# Patient Record
Sex: MALE | Race: WHITE | NOT HISPANIC OR LATINO | Employment: OTHER | URBAN - NONMETROPOLITAN AREA
[De-identification: names, ages, dates, MRNs, and addresses within clinical notes are randomized per-mention and may not be internally consistent; named-entity substitution may affect disease eponyms.]

---

## 2023-11-20 ENCOUNTER — TELEPHONE (OUTPATIENT)
Dept: UROLOGY | Facility: CLINIC | Age: 68
End: 2023-11-20

## 2023-11-20 NOTE — TELEPHONE ENCOUNTER
Caller: KAROLINA    Relationship to patient: SELF    Best call back number: [phone]    Patient is needing: PT IS SCHEDULED FOR 2/5/24 BUT IS IN PAIN DAILY ESPECIALLY WHEN WAKING UP.  IS THERE ANY WAY TO GET SOONER APPT FOR THIS PT?

## 2023-12-07 ENCOUNTER — OFFICE VISIT (OUTPATIENT)
Dept: UROLOGY | Facility: CLINIC | Age: 68
End: 2023-12-07
Payer: MEDICARE

## 2023-12-07 VITALS
WEIGHT: 179.4 LBS | HEIGHT: 69 IN | HEART RATE: 84 BPM | SYSTOLIC BLOOD PRESSURE: 164 MMHG | BODY MASS INDEX: 26.57 KG/M2 | DIASTOLIC BLOOD PRESSURE: 94 MMHG

## 2023-12-07 DIAGNOSIS — N40.1 BENIGN PROSTATIC HYPERPLASIA WITH WEAK URINARY STREAM: Primary | ICD-10-CM

## 2023-12-07 DIAGNOSIS — R10.9 BILATERAL FLANK PAIN: ICD-10-CM

## 2023-12-07 DIAGNOSIS — R39.12 BENIGN PROSTATIC HYPERPLASIA WITH WEAK URINARY STREAM: Primary | ICD-10-CM

## 2023-12-07 DIAGNOSIS — N28.1 BILATERAL RENAL CYSTS: ICD-10-CM

## 2023-12-07 DIAGNOSIS — N52.01 ERECTILE DYSFUNCTION DUE TO ARTERIAL INSUFFICIENCY: ICD-10-CM

## 2023-12-07 PROCEDURE — 84153 ASSAY OF PSA TOTAL: CPT

## 2023-12-07 RX ORDER — PRAVASTATIN SODIUM 40 MG
40 TABLET ORAL NIGHTLY
COMMUNITY
Start: 2023-09-06

## 2023-12-07 RX ORDER — GLIPIZIDE 10 MG/1
10 TABLET ORAL 2 TIMES DAILY
COMMUNITY
Start: 2023-09-15

## 2023-12-07 RX ORDER — OMEPRAZOLE 40 MG/1
40 CAPSULE, DELAYED RELEASE ORAL DAILY
COMMUNITY
Start: 2023-09-27

## 2023-12-07 RX ORDER — EMPAGLIFLOZIN 25 MG/1
25 TABLET, FILM COATED ORAL DAILY
COMMUNITY
Start: 2023-09-15

## 2023-12-07 RX ORDER — MELOXICAM 15 MG/1
15 TABLET ORAL DAILY
COMMUNITY
Start: 2023-09-27

## 2023-12-07 RX ORDER — ZOLPIDEM TARTRATE 10 MG/1
10 TABLET ORAL NIGHTLY PRN
COMMUNITY
Start: 2023-09-27

## 2023-12-07 RX ORDER — TADALAFIL 20 MG/1
20 TABLET ORAL AS NEEDED
Qty: 20 TABLET | Refills: 1 | Status: SHIPPED | OUTPATIENT
Start: 2023-12-07

## 2023-12-07 RX ORDER — DULAGLUTIDE 4.5 MG/.5ML
4.5 INJECTION, SOLUTION SUBCUTANEOUS WEEKLY
COMMUNITY
Start: 2023-08-21

## 2023-12-07 RX ORDER — GABAPENTIN 400 MG/1
2 CAPSULE ORAL 3 TIMES DAILY
COMMUNITY
Start: 2023-09-15

## 2023-12-07 RX ORDER — TADALAFIL 5 MG/1
5 TABLET ORAL DAILY
Qty: 30 TABLET | Refills: 1 | Status: SHIPPED | OUTPATIENT
Start: 2023-12-07

## 2023-12-07 NOTE — PROGRESS NOTES
"Chief Complaint:    Chief Complaint   Patient presents with    Kidney Cyst     New patient       Vital Signs:   /94   Pulse 84   Ht 175.3 cm (69\")   Wt 81.4 kg (179 lb 6.4 oz)   BMI 26.49 kg/m²   Body mass index is 26.49 kg/m².      HPI:  Shane Mc is a 68 y.o. male who presents today for initial evaluation     History of Present Illness  Mr. Mc presents to the clinic for evaluation of renal cysts, bilateral flank pain, prostatic enlargement, and erectile dysfunction.  Patient reports that he began to have right-sided lower quadrant and left-sided flank/quadrant pain in October and went to the ER for evaluation.  He had a CT scan of the abdomen pelvis completed at that time that showed hepatic and bilateral renal simple cysts. There was a 2 cm exophytic lesion arising from the upper lateral right kidney consistent with a cyst. A smaller, indeterminate, hypodense lesion was seen arising from the medial pole of the left kidney and also likely represented a cyst. There was also a 1.6 cm hypodensity in the posterior left kidney consistent with a cyst.  His hepatic cyst measured roughly 1.7 cm in size.  He also had notable prostatic enlargement at 6 cm with some distention of the urinary bladder.  No significant hydronephrosis or stones were identified.  He had moderate constipation as well.  Patient states he has a bowel movement roughly every 1 to 2 days.  States that his flank pain is worse when sitting for long periods of time.  He did have known arthritic changes of the hips bilaterally as well as the lumbar facets.  He does complain of some difficulty urinating, urgency, weak stream, hesitancy, and frequency.  He does not get up at all throughout the night.  Denies any family history of prostate cancer.  He is unsure if he has had a PSA in the past.  Digital rectal exam today the prostate is roughly 40 g on palpation but is smooth and firm to palpation with no signs of any nodules.  He does " "endorse a history of erectile dysfunction which she has tried sildenafil and tadalafil in the past with no improvement.  Patient reports that he can take \" 200 mg of Viagra and go to sleep.\"  He is a diabetic and reports his last A1c was roughly 7.        Past Medical History:  No past medical history on file.    Current Meds:  Current Outpatient Medications   Medication Sig Dispense Refill    gabapentin (NEURONTIN) 400 MG capsule Take 2 capsules by mouth 3 (Three) Times a Day.      glipizide (GLUCOTROL) 10 MG tablet Take 1 tablet by mouth 2 (Two) Times a Day.      Jardiance 25 MG tablet tablet Take 1 tablet by mouth Daily.      meloxicam (MOBIC) 15 MG tablet Take 1 tablet by mouth Daily.      omeprazole (priLOSEC) 40 MG capsule Take 1 capsule by mouth Daily.      pravastatin (PRAVACHOL) 40 MG tablet Take 1 tablet by mouth Every Night.      sertraline (ZOLOFT) 50 MG tablet Take 1 tablet by mouth Daily.      Trulicity 4.5 MG/0.5ML solution pen-injector Inject 0.5 mL as directed 1 (One) Time Per Week.      zolpidem (AMBIEN) 10 MG tablet Take 1 tablet by mouth At Night As Needed.      tadalafil (Cialis) 20 MG tablet Take 1 tablet by mouth As Needed for Erectile Dysfunction. 20 tablet 1    tadalafil (CIALIS) 5 MG tablet Take 1 tablet by mouth Daily. 30 tablet 1     No current facility-administered medications for this visit.        Allergies:   No Known Allergies     Past Surgical History:  No past surgical history on file.    Social History:  Social History     Socioeconomic History    Marital status:    Tobacco Use    Smoking status: Never   Vaping Use    Vaping Use: Never used       Family History:  No family history on file.    Review of Systems:  Review of Systems   Constitutional:  Negative for fatigue, fever and unexpected weight change.   Respiratory:  Negative for chest tightness and shortness of breath.    Cardiovascular:  Negative for chest pain.   Gastrointestinal:  Positive for abdominal pain and " diarrhea. Negative for constipation, nausea and vomiting.   Genitourinary:  Positive for difficulty urinating, flank pain and urgency. Negative for dysuria and frequency.   Skin:  Negative for rash.   Psychiatric/Behavioral:  Negative for confusion and suicidal ideas.        Physical Exam:  Physical Exam  Constitutional:       General: He is not in acute distress.     Appearance: Normal appearance.   HENT:      Head: Normocephalic and atraumatic.      Nose: Nose normal.      Mouth/Throat:      Mouth: Mucous membranes are moist.   Eyes:      Conjunctiva/sclera: Conjunctivae normal.   Cardiovascular:      Rate and Rhythm: Normal rate and regular rhythm.      Pulses: Normal pulses.      Heart sounds: Normal heart sounds.   Pulmonary:      Effort: Pulmonary effort is normal.      Breath sounds: Normal breath sounds.   Abdominal:      General: Bowel sounds are normal.      Palpations: Abdomen is soft.   Genitourinary:     Comments: Enlarged smooth firm prostate with no nodules noted.  Musculoskeletal:         General: Normal range of motion.      Cervical back: Normal range of motion.   Skin:     General: Skin is warm.   Neurological:      General: No focal deficit present.      Mental Status: He is alert and oriented to person, place, and time.   Psychiatric:         Mood and Affect: Mood normal.         Behavior: Behavior normal.         Thought Content: Thought content normal.         Judgment: Judgment normal.           Recent Image (CT and/or KUB):   CT Abdomen and Pelvis: No results found for this or any previous visit.     CT Stone Protocol: No results found for this or any previous visit.     KUB: No results found for this or any previous visit.       Labs:  Brief Urine Lab Results       None          No visits with results within 3 Month(s) from this visit.   Latest known visit with results is:   No results found for any previous visit.        Procedure: None  Procedures     I have reviewed and agree with the  above PMH, PSH, FMH, social history, medications, allergies, and labs.     Assessment/Plan:   Problem List Items Addressed This Visit          Gastrointestinal Abdominal     Bilateral flank pain       Genitourinary and Reproductive     Benign prostatic hyperplasia with weak urinary stream - Primary    Relevant Medications    tadalafil (CIALIS) 5 MG tablet    tadalafil (Cialis) 20 MG tablet    Other Relevant Orders    PSA Diagnostic    Erectile dysfunction due to arterial insufficiency    Relevant Medications    tadalafil (Cialis) 20 MG tablet    Bilateral renal cysts       Health Maintenance:   Health Maintenance Due   Topic Date Due    URINE MICROALBUMIN  Never done    BMI FOLLOWUP  Never done    Pneumococcal Vaccine 65+ (1 - PCV) Never done    HEPATITIS C SCREENING  Never done    ANNUAL WELLNESS VISIT  Never done    DIABETIC FOOT EXAM  Never done    HEMOGLOBIN A1C  Never done    DIABETIC EYE EXAM  Never done        Smoking Counseling: Never smoked or used smokeless tobacco.  Counseling given.    Urine Incontinence: Patient reports that he is not currently experiencing any symptoms of urinary incontinence.    Patient was given instructions and counseling regarding his condition or for health maintenance advice. Please see specific information pulled into the AVS if appropriate.    Patient Education:   Benign Prostate Hypertrophy (BPH): Discussed the pathophysiology of BPH and obstruction.  We discussed the static and dynamic effects of BPH as well as using 5 alpha reductase inhibitors versus alpha blockade.  We discussed the indications for transurethral surgery as well and/ or other therapeutic options available including all of the newer techniques.  Given patient's lower urinary tract symptoms and urinary distention on CT I am recommending to start tadalafil 5 mg once nightly.  Discussed the risk and benefits of this medication with the patient.  I did discuss dual therapy with finasteride and other 5 alpha  reductase inhibitors hide I am recommending to hold off until PSA results are obtained.  Patient verbalized understanding.  PSA testing - I am recommending a prostate specific antigen blood test or PSA.  I discussed the pathophysiology of PSA testing indicating its use in the diagnosis and management of prostate cancer.  I discussed the normal range being 0 to 4, but more appropriately being much closer to 0 to 2 in a normal male.  I discussed the fact that after a certain age it is against recommendation to use PSA testing especially in view of numerous comorbidities.  I discussed many of the things that can artificially raise PSA including put not limited to a recent infection, urinary tract infection, recent sexual intercourse, or even the type of movement such as manipulation of the prostate from riding a bicycle.  It was discussed that the most important use of PSA is the velocity measurement.  This refers to the change of PSA with time. I discussed that we look for greater than 20% rise over a year to help us make the prediction of prostate cancer.  I also discussed that in the case of prostate cancer indicating a radical prostatectomy, the PSA should be 0 and any rise indicates an early biochemical recurrence.  I will call patient with PSA results once obtained.  Bilateral renal cysts -discussed with the patient the pathophysiology of renal cyst.  Discussed with patient classifications of renal cyst including a Bosniak type I-IV.  Advising type I and II was low risk for renal cell carcinoma at less than 10 to 15% respectively.  Advised patient that a type III and IV are high risk for renal cell carcinoma at roughly 50 to 100%.  Did discuss further work-up with a CT scan with and without contrast versus biopsy when indicated.  Patient had 2-3 simple appearing renal cysts on CT scan roughly 2 months ago.  At this time recommending observation.  We can repeat a ultrasound in roughly 6 months for reevaluation.   Patient verbalized understanding.  Flank pain -advised patient that his flank pain may be secondary to lumbar facet and bilateral hip arthritis.  Also advised him flank pain could be secondary to constipation and urinary distention from enlarged prostate.  Recommend to take a daily stool softener twice daily.  Vies patient to increase water intake to 2 to 3 L/day.  Advised patient to use ice in areas where pain occurs.  Erectile dysfunction -discussed with the patient the pathophysiology of erectile dysfunction.  I explained to the patient that erectile dysfunction is a symptom and no disorder.  I educated the patient that erectile dysfunction is often secondary to significant arterial insufficiency, diabetes mellitus, medications, trauma, low testosterone, or psychological factors.  I discussed with the patient ways to help treat erectile dysfunction which include but are not limited to medications, mechanical devices, penile injections, and penile implants.  Patient has tried oral medications in the past with minimal improvement.  He would like to retry tadalafil 20 mg on demand as needed.  I will give the patient a refill.  Advised the risk and benefits of this medication.  Advised patient to discontinue medication if he begins to experience chest pain, shortness of breath, prolonged erection, blurry vision, lightheadedness, or dizziness.  Vies him to go to the nearest ER if he has erection lasting longer than 2 hours on becomes painful.  Otherwise I placed the patient back in 2 months for reevaluation.      Visit Diagnoses:    ICD-10-CM ICD-9-CM   1. Benign prostatic hyperplasia with weak urinary stream  N40.1 600.01    R39.12 788.62   2. Erectile dysfunction due to arterial insufficiency  N52.01 607.84   3. Bilateral flank pain  R10.9 789.09   4. Bilateral renal cysts  N28.1 753.10       Meds Ordered During Visit:  New Medications Ordered This Visit   Medications    tadalafil (CIALIS) 5 MG tablet     Sig: Take  1 tablet by mouth Daily.     Dispense:  30 tablet     Refill:  1    tadalafil (Cialis) 20 MG tablet     Sig: Take 1 tablet by mouth As Needed for Erectile Dysfunction.     Dispense:  20 tablet     Refill:  1       Follow Up Appointment: 2 months or sooner if needed  No follow-ups on file.      This document has been electronically signed by Perry Patel PA-C   December 7, 2023 11:57 EST    Part of this note may be an electronic transcription/translation of spoken language to printed text using the Dragon Dictation System.

## 2023-12-08 ENCOUNTER — TELEPHONE (OUTPATIENT)
Dept: UROLOGY | Facility: CLINIC | Age: 68
End: 2023-12-08
Payer: MEDICARE

## 2023-12-08 DIAGNOSIS — N40.1 BENIGN PROSTATIC HYPERPLASIA WITH WEAK URINARY STREAM: Primary | ICD-10-CM

## 2023-12-08 DIAGNOSIS — R39.12 BENIGN PROSTATIC HYPERPLASIA WITH WEAK URINARY STREAM: Primary | ICD-10-CM

## 2023-12-08 LAB — PSA SERPL-MCNC: 2.12 NG/ML (ref 0–4)

## 2023-12-08 RX ORDER — FINASTERIDE 5 MG/1
5 TABLET, FILM COATED ORAL DAILY
Qty: 30 TABLET | Refills: 5 | Status: SHIPPED | OUTPATIENT
Start: 2023-12-08

## 2023-12-08 NOTE — TELEPHONE ENCOUNTER
Patients PSA was normal at 2.12. We will send in finasteride to start once daily. Patient verbalized understanding.

## 2024-05-28 DIAGNOSIS — R39.12 BENIGN PROSTATIC HYPERPLASIA WITH WEAK URINARY STREAM: ICD-10-CM

## 2024-05-28 DIAGNOSIS — N40.1 BENIGN PROSTATIC HYPERPLASIA WITH WEAK URINARY STREAM: ICD-10-CM

## 2024-05-28 RX ORDER — FINASTERIDE 5 MG/1
5 TABLET, FILM COATED ORAL DAILY
Qty: 90 TABLET | Refills: 0 | Status: SHIPPED | OUTPATIENT
Start: 2024-05-28

## 2024-08-22 DIAGNOSIS — N40.1 BENIGN PROSTATIC HYPERPLASIA WITH WEAK URINARY STREAM: ICD-10-CM

## 2024-08-22 DIAGNOSIS — R39.12 BENIGN PROSTATIC HYPERPLASIA WITH WEAK URINARY STREAM: ICD-10-CM

## 2024-08-22 RX ORDER — FINASTERIDE 5 MG/1
5 TABLET, FILM COATED ORAL DAILY
Qty: 90 TABLET | Refills: 0 | Status: SHIPPED | OUTPATIENT
Start: 2024-08-22

## 2024-11-17 DIAGNOSIS — N40.1 BENIGN PROSTATIC HYPERPLASIA WITH WEAK URINARY STREAM: ICD-10-CM

## 2024-11-17 DIAGNOSIS — R39.12 BENIGN PROSTATIC HYPERPLASIA WITH WEAK URINARY STREAM: ICD-10-CM

## 2024-11-18 RX ORDER — FINASTERIDE 5 MG/1
5 TABLET, FILM COATED ORAL DAILY
Qty: 90 TABLET | Refills: 0 | Status: SHIPPED | OUTPATIENT
Start: 2024-11-18

## 2025-02-13 DIAGNOSIS — R39.12 BENIGN PROSTATIC HYPERPLASIA WITH WEAK URINARY STREAM: ICD-10-CM

## 2025-02-13 DIAGNOSIS — N40.1 BENIGN PROSTATIC HYPERPLASIA WITH WEAK URINARY STREAM: ICD-10-CM

## 2025-02-13 RX ORDER — FINASTERIDE 5 MG/1
5 TABLET, FILM COATED ORAL DAILY
Qty: 90 TABLET | Refills: 0 | Status: SHIPPED | OUTPATIENT
Start: 2025-02-13

## 2025-05-10 DIAGNOSIS — N40.1 BENIGN PROSTATIC HYPERPLASIA WITH WEAK URINARY STREAM: ICD-10-CM

## 2025-05-10 DIAGNOSIS — R39.12 BENIGN PROSTATIC HYPERPLASIA WITH WEAK URINARY STREAM: ICD-10-CM

## 2025-05-12 RX ORDER — FINASTERIDE 5 MG/1
5 TABLET, FILM COATED ORAL DAILY
Qty: 90 TABLET | Refills: 0 | Status: SHIPPED | OUTPATIENT
Start: 2025-05-12

## 2025-08-07 DIAGNOSIS — R39.12 BENIGN PROSTATIC HYPERPLASIA WITH WEAK URINARY STREAM: ICD-10-CM

## 2025-08-07 DIAGNOSIS — N40.1 BENIGN PROSTATIC HYPERPLASIA WITH WEAK URINARY STREAM: ICD-10-CM

## 2025-08-07 RX ORDER — FINASTERIDE 5 MG/1
5 TABLET, FILM COATED ORAL DAILY
Qty: 90 TABLET | Refills: 0 | Status: SHIPPED | OUTPATIENT
Start: 2025-08-07